# Patient Record
Sex: MALE | Race: WHITE | NOT HISPANIC OR LATINO
[De-identification: names, ages, dates, MRNs, and addresses within clinical notes are randomized per-mention and may not be internally consistent; named-entity substitution may affect disease eponyms.]

---

## 2020-02-03 ENCOUNTER — APPOINTMENT (OUTPATIENT)
Dept: VASCULAR SURGERY | Facility: CLINIC | Age: 40
End: 2020-02-03
Payer: COMMERCIAL

## 2020-02-03 PROCEDURE — 93970 EXTREMITY STUDY: CPT

## 2020-02-03 PROCEDURE — 99204 OFFICE O/P NEW MOD 45 MIN: CPT

## 2020-02-04 NOTE — PROCEDURE
[FreeTextEntry1] : B/L Venous duplex shows no evidence of DVT. FV POP Reflux. Varicose veins on R calf. Prox IVC visualized with flow. Iliac veins patent with dilation.

## 2020-02-04 NOTE — PHYSICAL EXAM
[Normal Breath Sounds] : Normal breath sounds [Oriented to Person] : oriented to person [Oriented to Place] : oriented to place [Alert] : alert [Oriented to Time] : oriented to time [Calm] : calm [Varicose Veins Of Lower Extremities] : present [No Rash or Lesion] : No rash or lesion [Varicose Veins Of The Right Leg] : of the right leg [Ankle Swelling Bilaterally] : severe [JVD] : no jugular venous distention  [de-identified] : Well-appearing, NAD [de-identified] : NCAT [FreeTextEntry1] : Bulging varicose veins throughout RLE [de-identified] : +FROM B/L

## 2020-02-04 NOTE — END OF VISIT
[FreeTextEntry3] : All medical record entries made by the Scribe were at my, Dr. Cordero's direction and personally dictated by me on 02/03/2020 . I have reviewed the chart and agree that the record accurately reflects my personal performance of the history, physical exam, assessment and plan. I have also personally directed, reviewed, and agreed with the chart.\par \par

## 2020-02-04 NOTE — ASSESSMENT
[FreeTextEntry1] : 38 y/o M with asymptomatic bulging RLE varicose veins. The patient is doing well today. B/L Venous duplex shows no evidence of DVT, IVC partially occluded and reflux is still present. Considering patient reports asymptomatic varicose veins, no vascular intervention is required at this time. Stab phlebectomy was discussed with the patient should he become symptomatic. Patient will follow-up PRN. \par  [Arterial/Venous Disease] : arterial/venous disease

## 2020-02-04 NOTE — HISTORY OF PRESENT ILLNESS
[FreeTextEntry1] : 40 y/o M presents today for f/u on RLE varicose veins. The patient was last seen in 2015 for RLE varicose veins and occluded IVC. The patient reports feeling well today. He reports varicose veins on his RLE get heavy towards end of the day however with no pain or discomfort. He states that he has switched to Xarelto from Coumadin. He also reports wearing higher compression stockings with some relief to his symptoms.
